# Patient Record
Sex: MALE | Race: WHITE | NOT HISPANIC OR LATINO | Employment: UNEMPLOYED | URBAN - METROPOLITAN AREA
[De-identification: names, ages, dates, MRNs, and addresses within clinical notes are randomized per-mention and may not be internally consistent; named-entity substitution may affect disease eponyms.]

---
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
## 2023-06-19 ENCOUNTER — OFFICE VISIT (OUTPATIENT)
Dept: OPHTHALMOLOGY | Facility: CLINIC | Age: 18
End: 2023-06-19
Attending: OPTOMETRIST
Payer: OTHER GOVERNMENT

## 2023-06-19 DIAGNOSIS — H52.12 MYOPIA, LEFT EYE: ICD-10-CM

## 2023-06-19 DIAGNOSIS — M46.1 SACROILIITIS (H): Primary | ICD-10-CM

## 2023-06-19 PROCEDURE — 92015 DETERMINE REFRACTIVE STATE: CPT | Mod: GY | Performed by: OPTOMETRIST

## 2023-06-19 PROCEDURE — G0463 HOSPITAL OUTPT CLINIC VISIT: HCPCS | Performed by: OPTOMETRIST

## 2023-06-19 PROCEDURE — 92014 COMPRE OPH EXAM EST PT 1/>: CPT | Performed by: OPTOMETRIST

## 2023-06-19 ASSESSMENT — CONF VISUAL FIELD
METHOD: COUNTING FINGERS
OD_NORMAL: 1
OD_SUPERIOR_NASAL_RESTRICTION: 0
OD_INFERIOR_NASAL_RESTRICTION: 0
OS_SUPERIOR_NASAL_RESTRICTION: 0
OS_INFERIOR_TEMPORAL_RESTRICTION: 0
OS_NORMAL: 1
OD_SUPERIOR_TEMPORAL_RESTRICTION: 0
OD_INFERIOR_TEMPORAL_RESTRICTION: 0
OS_INFERIOR_NASAL_RESTRICTION: 0
OS_SUPERIOR_TEMPORAL_RESTRICTION: 0

## 2023-06-19 ASSESSMENT — REFRACTION_MANIFEST
OD_CYLINDER: SPHERE
OS_CYLINDER: SPHERE
OD_SPHERE: PLANO
OS_SPHERE: -0.75

## 2023-06-19 ASSESSMENT — EXTERNAL EXAM - LEFT EYE: OS_EXAM: NORMAL

## 2023-06-19 ASSESSMENT — VISUAL ACUITY
OS_SC: J1+
OS_SC+: +2
OD_SC: J1+
METHOD: SNELLEN - LINEAR
OS_SC: 20/30-1
OD_SC: 20/20

## 2023-06-19 ASSESSMENT — SLIT LAMP EXAM - LIDS
COMMENTS: NORMAL
COMMENTS: NORMAL

## 2023-06-19 ASSESSMENT — TONOMETRY
IOP_METHOD: ICARE
OS_IOP_MMHG: 19
OD_IOP_MMHG: 18

## 2023-06-19 ASSESSMENT — CUP TO DISC RATIO
OS_RATIO: 0.2
OD_RATIO: 0.2

## 2023-06-19 ASSESSMENT — EXTERNAL EXAM - RIGHT EYE: OD_EXAM: NORMAL

## 2023-06-19 NOTE — Clinical Note
Thank you for referring Laurent AUSTIN Eduard for his annual eye exam. No uveitis on examination today. Ocular health was normal on examination. Recommended repeat evaluation in 1 year. Please contact me with any questions. Toñito Jeffers, OD on 6/21/2021 at 2:43 PM

## 2023-06-19 NOTE — PROGRESS NOTES
History  HPI    Laurent is here with his father for a three year (overdue) exam due to Sacroiliitis. No vision concerns at this time. No complaints of eye pain, redness, light sensitivity, or excessive tearing. No strabismus/AHP.     Last edited by Nehemias Jay COT on 6/19/2023 10:53 AM.          Assessment/Plan  (M46.1) Sacroiliitis (H)  (primary encounter diagnosis)  Comment: No uveitis on examination today, ocular health normal on examination today.  Plan:  Educated patient on clinical findings and the importance of continued management with primary care physician. Continue management as directed and return to clinic in 1 year for dilated exam, or sooner, as needed. Copy of chart sent to Dr. Chawla.    (H52.12) Myopia, left eye  Comment: Myopia left eye, first spectacle prescription   Plan: HC REFRACTION         Dispensed spectacle prescription for as-needed wear. Monitor annually.    Return to clinic in 1 year for comprehensive eye exam.    Complete documentation of historical and exam elements from today's encounter can  be found in the full encounter summary report (not reduplicated in this progress  note). I personally obtained the chief complaint(s) and history of present illness. I  confirmed and edited as necessary the review of systems, past medical/surgical  history, family history, social history, and examination findings as documented by  others; and I examined the patient myself. I personally reviewed the relevant tests,  images, and reports as documented above. I formulated and edited as necessary the  assessment and plan and discussed the findings and management plan with the  patient and family.    Toñito Jeffers, OD, FAAO

## 2023-06-19 NOTE — NURSING NOTE
Chief Complaint(s) and History of Present Illness(es)     Sacroiliitis           Comments    Laurent is here with his father for a three year (overdue) exam due to Sacroiliitis. No vision concerns at this time. No complaints of eye pain, redness, light sensitivity, or excessive tearing. No strabismus/AHP.

## 2023-09-08 NOTE — PROGRESS NOTES
Rheumatology History:   Date of first visit to center: 8/13/2020  Date of CESAR diagnosis: 8/13/2020  ILAR category: enthesitis-related  DANNIELLE Status: negative   RF Status: not done   CCP Status: not done   HLA-B27 Status: positive        Ophthalmology History:   Iritis/Uveitis Comorbidity: no   Date of last eye exam: 6/19/2023          Medications:   As of completion of this visit:  Current Outpatient Medications   Medication Sig Dispense Refill     EPINEPHrine (ANY BX GENERIC EQUIV) 0.3 MG/0.3ML injection 2-pack Inject 0.3 mg into the muscle as needed       Melatonin 2.5 MG CHEW 2.5 mg nightly or as needed.       meloxicam (MOBIC) 7.5 MG tablet Take 1 tablet (7.5 mg) by mouth daily 90 tablet 2     omeprazole (PRILOSEC) 20 MG DR capsule Take 1 capsule (20 mg) by mouth daily 30 capsule 3     Date of last TB Screen:  9/18/20           Subjective:   Laurent is a 18 year old male who was seen in Pediatric Rheumatology clinic today for follow up. Laurent was last seen in our clinic on 6/21/21 and returns today accompanied by his dad, Adama  The primary encounter diagnosis was Sacroiliitis (H). Diagnoses of NSAID long-term use, At risk for uveitis, and CESAR (juvenile idiopathic arthritis), enthesitis related arthritis (H) were also pertinent to this visit.      At Ismael's last visit, which was over two years ago, there was concern for GI upset and elevated fecal calprotectin caused by mild Crohn's disease vs meloxicam. The plan at that time was for Ismael to go to college in [place] with his family moving to [place] and to establish care with a rheumatologist in either location. Ismael did go to college for a year, but decided that academics was not a good fit for him. He did not establish care with a rheumatologist or GI doctor.     Ismael has been taking the meloxicam as needed for his SI joint pain L>R. He has been needing it almost every day lately and is almost out. He has noticed that when he's exposed to peanuts  (allergic) his arthritis will flare and he will have additional pain. He will take Benadryl for the allergy component, but doesn't take anything beyond meloxicam for the arthritis pain. He has soreness/stiffness in his lower back, but does not have stiffness or swelling in additional joints. He is not having abdominal pain or GI symptoms. The pain has come and gone over the past two years.     Ismael is working full time at the [employer]. He's considering becoming an . He is planning to move with his family to [place] this fall. They are closing on a house next month. Ismael works out often and does weights, body weight exercises, and just started doing cardio in his home gym.      Ismael also has had a runny nose persistently. His family has a history of allergic rhinitis that improves with OTC Flonase.     Prescribed medications have been administered regularly, without missed doses. Medications have been tolerated well, without side effects.    Comprehensive Review of Systems is otherwise negative.    Information per our standardized questionnaire is as below:    Self Report  Patient Pain Status: 1 (This is measured 0 = no pain, 10 = very severe pain)  Patient Global Assessment of Disease Activity: 0 (This is measured 0 = very well, 10 = very poorly)  Patient Highest Level of Education: high school     Interim Arthritis History  Morning Stiffness in the past week: no stiffness  Recent Back Pain: Yes    Since your last visit has your arthritis stopped you from trying any athletic or rigorous activities or interfaced with your ability to do these activities? Yes  Have you been limited your ability to do normal daily activities in the past week? No  Did you need help from other people to do normal activities in the past week? No  Have you used any aids or devices to help you do normal daily activities in the past week? No           Examination:   Blood pressure 107/72, pulse 66, temperature 98.1  F (36.7  " C), temperature source Oral, height 1.644 m (5' 4.72\"), weight 65 kg (143 lb 4.8 oz), SpO2 98 %.  Facility age limit for growth %lauro is 20 years.  Growth %ile SmartLinks can only be used for patients less than 20 years old.  Body surface area is 1.72 meters squared.     Gen: Well appearing; cooperative. No acute distress.  Head: Normal head and hair.  Eyes: No scleral injection, pupils normal.  Nose: No deformity, no rhinorrhea or congestion. No sores.  Mouth: Normal teeth and gums. Moist mucus membranes.   Lymphatics: No cervical, supraclavicular, axillary, or inguinal lymphadenopathy.  Lungs: No increased work of breathing. Lungs clear to auscultation bilaterally.  Heart: Regular rate and rhythm. No murmurs. Normal S1/S2. Normal peripheral perfusion.  Abdomen: Soft, non-tender, non-distended. No hepatosplenomegaly.  Skin: No rashes or lesions.  Neuro: Alert, interactive. Answers questions appropriately. CN intact. Normal strength and tone.   MSK: Mild tenderness to palpation of left SI joint. Tight hips with internal rotation. No evidence of current synovitis/arthritis of the cervical spine, TMJ, sternoclavicular, acromioclavicular, glenohumeral, elbow, wrists, finger, sacroiliac, hip, knee, ankle, or toe joints. No tendonitis or bursitis. No enthesitis. No leg length discrepancy. Gait is normal with walking and running.    Positive MAITE test:  No    Total active joints:  1   Total limited joints:  0  Tender entheses count:  0  SI Tenderness: Yes         Last Lab Results:     Recent Results (from the past 168 hour(s))   Erythrocyte sedimentation rate auto    Collection Time: 09/11/23  1:00 PM   Result Value Ref Range    Erythrocyte Sedimentation Rate 1 0 - 15 mm/hr   CRP inflammation    Collection Time: 09/11/23  1:00 PM   Result Value Ref Range    CRP Inflammation <3.00 <5.00 mg/L   Creatinine    Collection Time: 09/11/23  1:00 PM   Result Value Ref Range    Creatinine 1.01 0.67 - 1.17 mg/dL    GFR Estimate " >90 >60 mL/min/1.73m2   Routine UA with micro reflex to culture    Collection Time: 09/11/23  1:00 PM    Specimen: Urine, Midstream   Result Value Ref Range    Color Urine Light Yellow Colorless, Straw, Light Yellow, Yellow    Appearance Urine Clear Clear    Glucose Urine Negative Negative mg/dL    Bilirubin Urine Negative Negative    Ketones Urine Negative Negative mg/dL    Specific Gravity Urine 1.025 1.003 - 1.035    Blood Urine Negative Negative    pH Urine 7.0 5.0 - 7.0    Protein Albumin Urine Negative Negative mg/dL    Urobilinogen Urine Normal Normal, 2.0 mg/dL    Nitrite Urine Negative Negative    Leukocyte Esterase Urine Negative Negative    Mucus Urine Present (A) None Seen /LPF    RBC Urine 2 <=2 /HPF    WBC Urine <1 <=5 /HPF   Hepatic panel    Collection Time: 09/11/23  1:00 PM   Result Value Ref Range    Protein Total 7.7 6.4 - 8.3 g/dL    Albumin 4.9 3.5 - 5.2 g/dL    Bilirubin Total 0.6 <=1.2 mg/dL    Alkaline Phosphatase 80 40 - 129 U/L    AST 28 0 - 45 U/L    ALT 26 0 - 70 U/L    Bilirubin Direct <0.20 0.00 - 0.30 mg/dL   CBC with platelets and differential    Collection Time: 09/11/23  1:00 PM   Result Value Ref Range    WBC Count 7.9 4.0 - 11.0 10e3/uL    RBC Count 5.34 4.40 - 5.90 10e6/uL    Hemoglobin 15.3 13.3 - 17.7 g/dL    Hematocrit 45.8 40.0 - 53.0 %    MCV 86 78 - 100 fL    MCH 28.7 26.5 - 33.0 pg    MCHC 33.4 31.5 - 36.5 g/dL    RDW 12.4 10.0 - 15.0 %    Platelet Count 232 150 - 450 10e3/uL    % Neutrophils 59 %    % Lymphocytes 28 %    % Monocytes 10 %    % Eosinophils 2 %    % Basophils 0 %    % Immature Granulocytes 1 %    NRBCs per 100 WBC 0 <1 /100    Absolute Neutrophils 4.7 1.6 - 8.3 10e3/uL    Absolute Lymphocytes 2.2 0.8 - 5.3 10e3/uL    Absolute Monocytes 0.8 0.0 - 1.3 10e3/uL    Absolute Eosinophils 0.1 0.0 - 0.7 10e3/uL    Absolute Basophils 0.0 0.0 - 0.2 10e3/uL    Absolute Immature Granulocytes 0.0 <=0.4 10e3/uL    Absolute NRBCs 0.0 10e3/uL   HLA-B27 Typing    Collection  Time: 09/11/23  1:08 PM   Result Value Ref Range    [id number] METHOD SSOP     B locus B27 Pos           Assessment:   Ismael is a 18 year old male with sacroiliitis who is having symptoms while on NSAID therapy.     Given Ismael's past diagnostic uncertainty regarding whether his growth concerns and GI evaluation were consistent with mild Crohn's vs meloxicam with improving sacroiliitis had been informing our prior medication choice. Ideally, we would not have had a two year gap in visits while on NSAID monotherapy. Ismael is continuing to have waxing and waning pain with almost daily meloxicam use. He does not have additional joint involvement outside of his SI joints on history or physical exam. I would like to get an MRI to assess progression of Ismael's disease while on NSAID monotherapy.     If there is persistent inflammation on MRI, I would like to escalate Ismael to treatment with a TNF inhibitor. A TNF inhibitor such as Humira or Enbrel is the next appropriate treatment for sacroiliitis per the American College of Rheumatology/Arthritis Foundation 2019 guidelines for therapeutic approaches for sacroiliitis.     Citation: Patsy S, [facility] ST, Rosanne T, Raji D, Marie CA, Meghann ML, Edgar RA, Leda BM, Favian PJ, Len H, Donnie J, Evelyn J, Kan PA, Farhan MJ, Tavo VERDE, Lisa ML, Camilla CE, Jermey R, Yemi O, Isaak K, Molly AA, Avelino N, West AM, Hipolito M, Adi A, [place] J. 2019 American College of Rheumatology/Arthritis Foundation Guideline for the Treatment of Juvenile Idiopathic Arthritis: Therapeutic Approaches for Non-Systemic Polyarthritis, Sacroiliitis, and Enthesitis. Arthritis Care Res ([place]). 2019 Raymundo;71(6):717-734. doi: 10.1002/acr.91151. Epub 2019 Apr 25. PMID: 52678835; PMCID: RNX5009025.    Ismael will be moving to [place] with his family in the next few months. He will need to establish care with a rheumatologist and gastroenterologist for follow up in  [place]. When I call to discuss MRI results and plan with Ismael and his family, they will tell me who they have identified as Ismael's rheumatologist so that I can provide a warm handoff.     Ismael's labs today do not indicate medication toxicity from the meloxicam. He is HLA-B27 positive. This is not diagnostic, but is informative and helps classify Ismael's arthritis type as being closest to enthesitis-related arthritis predominantly manifesting with sacroiliitis. There had been prior question regarding an IBD component and whether Ismael's symptoms were associated with IBD. He isn't having GI symptoms at this time and his labs are normal. Ismael should follow up with a gastroenterologist for further assessment of whether he carries a diagnosis of IBD.     Health counseling reviewed: eye screening, exercise, transition    Treat to Target:   [id number] score: 2  Treatment target set: Yes   Treatment target: inactive disease   Disease activity: not at target   Physical function: not at target   Use of algorithm:            Plan:   1. Labs: We will get labs today. Expect results in the mail unless we need to discuss results.   2. Imaging: I have ordered an MRI of Ismael's SI joints. Please call to schedule  3. Medications: I have refilled the meloxicam and we'll discuss if new meds are needed after MRI  4. Referrals: I will connect with the rheumatologist you identify in [place]. Establish care with a GI physician in [place] as well.   5. Eye exams: Continue every yearly-next due June 2024  6. Follow up with new rheumatologist after moving.      If there are any new questions or concerns, I would be glad to help and can be reached through our main office at [phone] or our paging  at [phone].    Review of the result(s) of each unique test - as per lab results  Assessment requiring an independent historian(s) - family - dad  Ordering of each unique test  Prescription drug management  60 minutes spent  by me on the date of the encounter doing chart review, history and exam, documentation and further activities per the note      Kaitlyn Sequeira MD MPH   of Pediatrics  Division of Rheumatology, Allergy, and Immunology      CC  Patient Care Team:  Daniella Chawla MD as PCP - General (Pediatrics)  Alysha Gonzalez MD as MD (Family Medicine - Sports Medicine)  Kaitlyn Sequeira as Fellow (Student in organized health care education/training program)  Adalid Aponte MD as Referring Physician (Pediatric Gastroenterology)  Toñito Jeffers OD as Assigned Surgical Provider  DANIELLA CHAWLA    Copy to patient  Linda Chapa Armen  [id number] [facility] [id number]

## 2023-09-11 ENCOUNTER — OFFICE VISIT (OUTPATIENT)
Dept: RHEUMATOLOGY | Facility: CLINIC | Age: 18
End: 2023-09-11
Attending: STUDENT IN AN ORGANIZED HEALTH CARE EDUCATION/TRAINING PROGRAM
Payer: OTHER GOVERNMENT

## 2023-09-11 VITALS
OXYGEN SATURATION: 98 % | TEMPERATURE: 98.1 F | HEART RATE: 66 BPM | SYSTOLIC BLOOD PRESSURE: 107 MMHG | HEIGHT: 65 IN | DIASTOLIC BLOOD PRESSURE: 72 MMHG | BODY MASS INDEX: 23.88 KG/M2 | WEIGHT: 143.3 LBS

## 2023-09-11 DIAGNOSIS — Z79.1 NSAID LONG-TERM USE: ICD-10-CM

## 2023-09-11 DIAGNOSIS — Z01.00 EYE EXAM, ROUTINE: ICD-10-CM

## 2023-09-11 DIAGNOSIS — M08.80 JIA (JUVENILE IDIOPATHIC ARTHRITIS), ENTHESITIS RELATED ARTHRITIS (H): ICD-10-CM

## 2023-09-11 DIAGNOSIS — M46.1 SACROILIITIS (H): Primary | ICD-10-CM

## 2023-09-11 LAB
ALBUMIN SERPL BCG-MCNC: 4.9 G/DL (ref 3.5–5.2)
ALBUMIN UR-MCNC: NEGATIVE MG/DL
ALP SERPL-CCNC: 80 U/L (ref 40–129)
ALT SERPL W P-5'-P-CCNC: 26 U/L (ref 0–70)
APPEARANCE UR: CLEAR
AST SERPL W P-5'-P-CCNC: 28 U/L (ref 0–45)
BASOPHILS # BLD AUTO: 0 10E3/UL (ref 0–0.2)
BASOPHILS NFR BLD AUTO: 0 %
BILIRUB DIRECT SERPL-MCNC: <0.2 MG/DL (ref 0–0.3)
BILIRUB SERPL-MCNC: 0.6 MG/DL
BILIRUB UR QL STRIP: NEGATIVE
COLOR UR AUTO: ABNORMAL
CREAT SERPL-MCNC: 1.01 MG/DL (ref 0.67–1.17)
CRP SERPL-MCNC: <3 MG/L
EGFRCR SERPLBLD CKD-EPI 2021: >90 ML/MIN/1.73M2
EOSINOPHIL # BLD AUTO: 0.1 10E3/UL (ref 0–0.7)
EOSINOPHIL NFR BLD AUTO: 2 %
ERYTHROCYTE [DISTWIDTH] IN BLOOD BY AUTOMATED COUNT: 12.4 % (ref 10–15)
ERYTHROCYTE [SEDIMENTATION RATE] IN BLOOD BY WESTERGREN METHOD: 1 MM/HR (ref 0–15)
GLUCOSE UR STRIP-MCNC: NEGATIVE MG/DL
HCT VFR BLD AUTO: 45.8 % (ref 40–53)
HGB BLD-MCNC: 15.3 G/DL (ref 13.3–17.7)
HGB UR QL STRIP: NEGATIVE
IMM GRANULOCYTES # BLD: 0 10E3/UL
IMM GRANULOCYTES NFR BLD: 1 %
KETONES UR STRIP-MCNC: NEGATIVE MG/DL
LEUKOCYTE ESTERASE UR QL STRIP: NEGATIVE
LYMPHOCYTES # BLD AUTO: 2.2 10E3/UL (ref 0.8–5.3)
LYMPHOCYTES NFR BLD AUTO: 28 %
MCH RBC QN AUTO: 28.7 PG (ref 26.5–33)
MCHC RBC AUTO-ENTMCNC: 33.4 G/DL (ref 31.5–36.5)
MCV RBC AUTO: 86 FL (ref 78–100)
MONOCYTES # BLD AUTO: 0.8 10E3/UL (ref 0–1.3)
MONOCYTES NFR BLD AUTO: 10 %
MUCOUS THREADS #/AREA URNS LPF: PRESENT /LPF
NEUTROPHILS # BLD AUTO: 4.7 10E3/UL (ref 1.6–8.3)
NEUTROPHILS NFR BLD AUTO: 59 %
NITRATE UR QL: NEGATIVE
NRBC # BLD AUTO: 0 10E3/UL
NRBC BLD AUTO-RTO: 0 /100
PH UR STRIP: 7 [PH] (ref 5–7)
PLATELET # BLD AUTO: 232 10E3/UL (ref 150–450)
PROT SERPL-MCNC: 7.7 G/DL (ref 6.4–8.3)
RBC # BLD AUTO: 5.34 10E6/UL (ref 4.4–5.9)
RBC URINE: 2 /HPF
SP GR UR STRIP: 1.02 (ref 1–1.03)
UROBILINOGEN UR STRIP-MCNC: NORMAL MG/DL
WBC # BLD AUTO: 7.9 10E3/UL (ref 4–11)
WBC URINE: <1 /HPF

## 2023-09-11 PROCEDURE — 86140 C-REACTIVE PROTEIN: CPT | Performed by: STUDENT IN AN ORGANIZED HEALTH CARE EDUCATION/TRAINING PROGRAM

## 2023-09-11 PROCEDURE — 80076 HEPATIC FUNCTION PANEL: CPT | Performed by: STUDENT IN AN ORGANIZED HEALTH CARE EDUCATION/TRAINING PROGRAM

## 2023-09-11 PROCEDURE — 85652 RBC SED RATE AUTOMATED: CPT | Performed by: STUDENT IN AN ORGANIZED HEALTH CARE EDUCATION/TRAINING PROGRAM

## 2023-09-11 PROCEDURE — 81374 HLA I TYPING 1 ANTIGEN LR: CPT | Performed by: STUDENT IN AN ORGANIZED HEALTH CARE EDUCATION/TRAINING PROGRAM

## 2023-09-11 PROCEDURE — 99215 OFFICE O/P EST HI 40 MIN: CPT | Performed by: STUDENT IN AN ORGANIZED HEALTH CARE EDUCATION/TRAINING PROGRAM

## 2023-09-11 PROCEDURE — 36415 COLL VENOUS BLD VENIPUNCTURE: CPT | Performed by: STUDENT IN AN ORGANIZED HEALTH CARE EDUCATION/TRAINING PROGRAM

## 2023-09-11 PROCEDURE — 85018 HEMOGLOBIN: CPT | Performed by: STUDENT IN AN ORGANIZED HEALTH CARE EDUCATION/TRAINING PROGRAM

## 2023-09-11 PROCEDURE — 82565 ASSAY OF CREATININE: CPT | Performed by: STUDENT IN AN ORGANIZED HEALTH CARE EDUCATION/TRAINING PROGRAM

## 2023-09-11 PROCEDURE — 99417 PROLNG OP E/M EACH 15 MIN: CPT | Performed by: STUDENT IN AN ORGANIZED HEALTH CARE EDUCATION/TRAINING PROGRAM

## 2023-09-11 PROCEDURE — 81001 URINALYSIS AUTO W/SCOPE: CPT | Performed by: STUDENT IN AN ORGANIZED HEALTH CARE EDUCATION/TRAINING PROGRAM

## 2023-09-11 PROCEDURE — G0463 HOSPITAL OUTPT CLINIC VISIT: HCPCS | Performed by: STUDENT IN AN ORGANIZED HEALTH CARE EDUCATION/TRAINING PROGRAM

## 2023-09-11 RX ORDER — MELOXICAM 7.5 MG/1
7.5 TABLET ORAL DAILY
Qty: 90 TABLET | Refills: 2 | Status: SHIPPED | OUTPATIENT
Start: 2023-09-11

## 2023-09-11 ASSESSMENT — PAIN SCALES - GENERAL: PAINLEVEL: NO PAIN (0)

## 2023-09-11 NOTE — LETTER
9/11/2023      RE: Laurent Chapa  [address]  [facility] [address]     Dear Colleague,    Thank you for the opportunity to participate in the care of your patient, Laurent Chapa, at the [facility] at [facility]. Please see a copy of my visit note below.        Rheumatology History:   Date of first visit to center: 8/13/2020  Date of CESAR diagnosis: 8/13/2020  ILAR category: enthesitis-related  DANNIELLE Status: negative   RF Status: not done   CCP Status: not done   HLA-B27 Status: positive        Ophthalmology History:   Iritis/Uveitis Comorbidity: no   Date of last eye exam: 6/19/2023          Medications:   As of completion of this visit:  Current Outpatient Medications   Medication Sig Dispense Refill    EPINEPHrine (ANY BX GENERIC EQUIV) 0.3 MG/0.3ML injection 2-pack Inject 0.3 mg into the muscle as needed      Melatonin 2.5 MG CHEW 2.5 mg nightly or as needed.      meloxicam (MOBIC) 7.5 MG tablet Take 1 tablet (7.5 mg) by mouth daily 90 tablet 2    omeprazole (PRILOSEC) 20 MG DR capsule Take 1 capsule (20 mg) by mouth daily 30 capsule 3     Date of last TB Screen:  9/18/20           Subjective:   Laurent is a 18 year old male who was seen in Pediatric Rheumatology clinic today for follow up. Laurent was last seen in our clinic on 6/21/21 and returns today accompanied by his dad, Adama  The primary encounter diagnosis was Sacroiliitis (H). Diagnoses of NSAID long-term use, At risk for uveitis, and CESAR (juvenile idiopathic arthritis), enthesitis related arthritis (H) were also pertinent to this visit.      At Ismael's last visit, which was over two years ago, there was concern for GI upset and elevated fecal calprotectin caused by mild Crohn's disease vs meloxicam. The plan at that time was for Ismael to go to college in [place] with his family moving to [place] and to establish care with a rheumatologist in either  location. Ismael did go to college for a year, but decided that [employer] was not a good fit for him. He did not establish care with a rheumatologist or GI doctor.     Ismael has been taking the meloxicam as needed for his SI joint pain L>R. He has been needing it almost every day lately and is almost out. He has noticed that when he's exposed to peanuts (allergic) his arthritis will flare and he will have additional pain. He will take Benadryl for the allergy component, but doesn't take anything beyond meloxicam for the arthritis pain. He has soreness/stiffness in his lower back, but does not have stiffness or swelling in additional joints. He is not having abdominal pain or GI symptoms. The pain has come and gone over the past two years.     Ismael is working full time at the [employer]. He's considering becoming an . He is planning to move with his family to [place] this fall. They are closing on a house next month. Ismael works out often and does weights, body weight exercises, and just started doing cardio in his home gym.      Ismael also has had a runny nose persistently. His family has a history of allergic rhinitis that improves with OTC Flonase.     Prescribed medications have been administered regularly, without missed doses. Medications have been tolerated well, without side effects.    Comprehensive Review of Systems is otherwise negative.    Information per our standardized questionnaire is as below:    Self Report  Patient Pain Status: 1 (This is measured 0 = no pain, 10 = very severe pain)  Patient Global Assessment of Disease Activity: 0 (This is measured 0 = very well, 10 = very poorly)  Patient Highest Level of Education: high school     Interim Arthritis History  Morning Stiffness in the past week: no stiffness  Recent Back Pain: Yes    Since your last visit has your arthritis stopped you from trying any athletic or rigorous activities or interfaced with your ability to do these activities?  "Yes  Have you been limited your ability to do normal daily activities in the past week? No  Did you need help from other people to do normal activities in the past week? No  Have you used any aids or devices to help you do normal daily activities in the past week? No           Examination:   Blood pressure 107/72, pulse 66, temperature 98.1  F (36.7  C), temperature source Oral, height 1.644 m (5' 4.72\"), weight 65 kg (143 lb 4.8 oz), SpO2 98 %.  Facility age limit for growth %lauro is 20 years.  Growth %ile SmartLinks can only be used for patients less than 20 years old.  Body surface area is 1.72 meters squared.     Gen: Well appearing; cooperative. No acute distress.  Head: Normal head and hair.  Eyes: No scleral injection, pupils normal.  Nose: No deformity, no rhinorrhea or congestion. No sores.  Mouth: Normal teeth and gums. Moist mucus membranes.   Lymphatics: No cervical, supraclavicular, axillary, or inguinal lymphadenopathy.  Lungs: No increased work of breathing. Lungs clear to auscultation bilaterally.  Heart: Regular rate and rhythm. No murmurs. Normal S1/S2. Normal peripheral perfusion.  Abdomen: Soft, non-tender, non-distended. No hepatosplenomegaly.  Skin: No rashes or lesions.  Neuro: Alert, interactive. Answers questions appropriately. CN intact. Normal strength and tone.   MSK: Mild tenderness to palpation of left SI joint. Tight hips with internal rotation. No evidence of current synovitis/arthritis of the cervical spine, TMJ, sternoclavicular, acromioclavicular, glenohumeral, elbow, wrists, finger, sacroiliac, hip, knee, ankle, or toe joints. No tendonitis or bursitis. No enthesitis. No leg length discrepancy. Gait is normal with walking and running.    Positive MAITE test:  No    Total active joints:  1   Total limited joints:  0  Tender entheses count:  0  SI Tenderness: Yes         Last Lab Results:     Recent Results (from the past 168 hour(s))   Erythrocyte sedimentation rate auto    " Collection Time: 09/11/23  1:00 PM   Result Value Ref Range    Erythrocyte Sedimentation Rate 1 0 - 15 mm/hr   CRP inflammation    Collection Time: 09/11/23  1:00 PM   Result Value Ref Range    CRP Inflammation <3.00 <5.00 mg/L   Creatinine    Collection Time: 09/11/23  1:00 PM   Result Value Ref Range    Creatinine 1.01 0.67 - 1.17 mg/dL    GFR Estimate >90 >60 mL/min/1.73m2   Routine UA with micro reflex to culture    Collection Time: 09/11/23  1:00 PM    Specimen: Urine, Midstream   Result Value Ref Range    Color Urine Light Yellow Colorless, Straw, Light Yellow, Yellow    Appearance Urine Clear Clear    Glucose Urine Negative Negative mg/dL    Bilirubin Urine Negative Negative    Ketones Urine Negative Negative mg/dL    Specific Gravity Urine 1.025 1.003 - 1.035    Blood Urine Negative Negative    pH Urine 7.0 5.0 - 7.0    Protein Albumin Urine Negative Negative mg/dL    Urobilinogen Urine Normal Normal, 2.0 mg/dL    Nitrite Urine Negative Negative    Leukocyte Esterase Urine Negative Negative    Mucus Urine Present (A) None Seen /LPF    RBC Urine 2 <=2 /HPF    WBC Urine <1 <=5 /HPF   Hepatic panel    Collection Time: 09/11/23  1:00 PM   Result Value Ref Range    Protein Total 7.7 6.4 - 8.3 g/dL    Albumin 4.9 3.5 - 5.2 g/dL    Bilirubin Total 0.6 <=1.2 mg/dL    Alkaline Phosphatase 80 40 - 129 U/L    AST 28 0 - 45 U/L    ALT 26 0 - 70 U/L    Bilirubin Direct <0.20 0.00 - 0.30 mg/dL   CBC with platelets and differential    Collection Time: 09/11/23  1:00 PM   Result Value Ref Range    WBC Count 7.9 4.0 - 11.0 10e3/uL    RBC Count 5.34 4.40 - 5.90 10e6/uL    Hemoglobin 15.3 13.3 - 17.7 g/dL    Hematocrit 45.8 40.0 - 53.0 %    MCV 86 78 - 100 fL    MCH 28.7 26.5 - 33.0 pg    MCHC 33.4 31.5 - 36.5 g/dL    RDW 12.4 10.0 - 15.0 %    Platelet Count 232 150 - 450 10e3/uL    % Neutrophils 59 %    % Lymphocytes 28 %    % Monocytes 10 %    % Eosinophils 2 %    % Basophils 0 %    % Immature Granulocytes 1 %    NRBCs per  100 WBC 0 <1 /100    Absolute Neutrophils 4.7 1.6 - 8.3 10e3/uL    Absolute Lymphocytes 2.2 0.8 - 5.3 10e3/uL    Absolute Monocytes 0.8 0.0 - 1.3 10e3/uL    Absolute Eosinophils 0.1 0.0 - 0.7 10e3/uL    Absolute Basophils 0.0 0.0 - 0.2 10e3/uL    Absolute Immature Granulocytes 0.0 <=0.4 10e3/uL    Absolute NRBCs 0.0 10e3/uL   HLA-B27 Typing    Collection Time: 09/11/23  1:08 PM   Result Value Ref Range    [id number] METHOD SSOP     B locus B27 Pos           Assessment:   Ismael is a 18 year old male with sacroiliitis who is having symptoms while on NSAID therapy.     Given Ismael's past diagnostic uncertainty regarding whether his growth concerns and GI evaluation were consistent with mild Crohn's vs meloxicam with improving sacroiliitis had been informing our prior medication choice. Ideally, we would not have had a two year gap in visits while on NSAID monotherapy. Ismael is continuing to have waxing and waning pain with almost daily meloxicam use. He does not have additional joint involvement outside of his SI joints on history or physical exam. I would like to get an MRI to assess progression of Ismael's disease while on NSAID monotherapy.     If there is persistent inflammation on MRI, I would like to escalate Ismael to treatment with a TNF inhibitor. A TNF inhibitor such as Humira or Enbrel is the next appropriate treatment for sacroiliitis per the American College of Rheumatology/Arthritis Foundation 2019 guidelines for therapeutic approaches for sacroiliitis.     Citation: Patsy S, [facility] ST, Rosanne T, Raji D, Marie CA, Meghann ML, Edgar RA, Leda BM, Favian PJ, Len H, Donnie J, Evelyn J, Kan PA, Farhan MJ, Tavo MH, Lisa ML, Camilla CE, Jeremy R, Yemi O, Isaak K, Molly AA, Avelino N, West AM, Hipolito M, Adi A, Stockton J. 2019 American College of Rheumatology/Arthritis Foundation Guideline for the Treatment of Juvenile Idiopathic Arthritis: Therapeutic Approaches for  Non-Systemic Polyarthritis, Sacroiliitis, and Enthesitis. Arthritis Care Res ([place]). 2019 Raymundo;71(6):717-734. doi: 10.1002/acr.98513. Epub 2019 Apr 25. PMID: 47457124; PMCID: DVD1938963.    Ismael will be moving to [place] with his family in the next few months. He will need to establish care with a rheumatologist and gastroenterologist for follow up in [place]. When I call to discuss MRI results and plan with Ismael and his family, they will tell me who they have identified as Ismael's rheumatologist so that I can provide a warm handoff.     Ismael's labs today do not indicate medication toxicity from the meloxicam. He is HLA-B27 positive. This is not diagnostic, but is informative and helps classify Ismael's arthritis type as being closest to enthesitis-related arthritis predominantly manifesting with sacroiliitis. There had been prior question regarding an IBD component and whether Ismael's symptoms were associated with IBD. He isn't having GI symptoms at this time and his labs are normal. Ismael should follow up with a gastroenterologist for further assessment of whether he carries a diagnosis of IBD.     Health counseling reviewed: eye screening, exercise, transition    Treat to Target:   [id number] score: 2  Treatment target set: Yes   Treatment target: inactive disease   Disease activity: not at target   Physical function: not at target   Use of algorithm:            Plan:   Labs: We will get labs today. Expect results in the mail unless we need to discuss results.   Imaging: I have ordered an MRI of Ismael's SI joints. Please call to schedule  Medications: I have refilled the meloxicam and we'll discuss if new meds are needed after MRI  Referrals: I will connect with the rheumatologist you identify in [place]. Establish care with a GI physician in [place] as well.   Eye exams: Continue every yearly-next due June 2024  Follow up with new rheumatologist after moving.      If there are any new questions or  concerns, I would be glad to help and can be reached through our main office at [phone] or our paging  at [phone].    Review of the result(s) of each unique test - as per lab results  Assessment requiring an independent historian(s) - family - dad  Ordering of each unique test  Prescription drug management  60 minutes spent by me on the date of the encounter doing chart review, history and exam, documentation and further activities per the note      Kaitlyn Sequeira MD MPH   of Pediatrics  Division of Rheumatology, Allergy, and Immunology      CC  Patient Care Team:  Daniella Chawla MD as PCP - General (Pediatrics)    Copy to patient  Linda Chapa [place]  [address] [facility] [address]

## 2023-09-11 NOTE — PATIENT INSTRUCTIONS
Laurent Chapa saw Dr. Sequeira on September 11, 2023 for a follow-up/initial visit regarding his sacroiliitis    Overall Assessment: Ismael is still having sacroiliitis. I would like to get an MRI to reassess how active it is.     Plan:    Labs: We will get labs today. Expect results in the mail.     Imaging: I have ordered an MRI. Please call to schedule    Medications: I have refilled the meloxicam and we'll discuss if new meds are needed after MRI    Referrals: I will connect with the rheumatologist you identify in [place]    Eye exams: Continue every yearly-next due June 2024    Follow up with- new rheumatologist after moving.     Thank you for allowing me to participate in Laurent's care. If there are any questions or concerns, please do not hesitate to contact us at the phone numbers below.    Kaitlyn Sequeira MD, MPH   of Pediatrics  Division of Rheumatology, Allergy, and Immunology     For Patient Education Materials:  [email or web address]/fo       [facility] Physicians Pediatric Rheumatology    For Help:  The Pediatric Call Center at [phone] can help with scheduling of routine follow up visits.  Ciara Carter and Ellyn Horne are the Nurse Coordinators for the Division of Pediatric Rheumatology and can be reached by phone at [phone] or through [employer] ([email or web address]). They can help with questions about your child s rheumatic condition, medications, and test results.  For emergencies after hours or on the weekends, please call the page  at [phone] and ask to speak to the physician on-call for Pediatric Rheumatology. Please do not use [employer] for urgent requests.  Main  Services:  [phone]  Hmong/Nauruan/Solomon Islander: [phone]  Norwegian: [phone]  Cymraes: [phone]    Internal Referrals: If we refer your child to another physician/team within [facility], you should receive a call to set this up. If you do not hear anything within  a week, please call the Call Center at [phone].    External Referrals: If we refer your child to a physician/team outside of [facility], our team will send the referral order and relevant records to them. We ask that you call the place where your child is being referred to ensure they received the needed information and notify our team coordinators if not.    Imaging: If your child needs an imaging study that is not being performed the day of your clinic appointment, please call to set this up. For xrays, ultrasounds, and echocardiogram call [phone]. For CT or MRI call [phone].     MyChart: We encourage you to sign up for MyChart at [email or web address]. For assistance or questions, call [phone]. If your child is 12 years or older, a consent for proxy/parent access needs to be signed so please discuss this with your physician at the next visit.

## 2023-09-11 NOTE — NURSING NOTE
"Chief Complaint   Patient presents with    Follow Up     3 month follow up        Vitals:    09/11/23 1221   BP: 107/72   BP Location: Right arm   Patient Position: Sitting   Cuff Size: Adult Large   Pulse: 66   Temp: 98.1  F (36.7  C)   TempSrc: Oral   SpO2: 98%   Weight: 143 lb 4.8 oz (65 kg)   Height: 5' 4.72\" (164.4 cm)     Patient MyChart Active? No  If no, would they like to sign up? Yes    Does patient need PHQ-2 completed today? No    Liseth Lerner  September 11, 2023  "

## 2023-09-13 LAB
B LOCUS: NORMAL
B27TEST METHOD: NORMAL

## 2023-09-22 ENCOUNTER — HOSPITAL ENCOUNTER (OUTPATIENT)
Dept: MRI IMAGING | Facility: CLINIC | Age: 18
Discharge: HOME | End: 2023-09-22
Attending: STUDENT IN AN ORGANIZED HEALTH CARE EDUCATION/TRAINING PROGRAM | Admitting: STUDENT IN AN ORGANIZED HEALTH CARE EDUCATION/TRAINING PROGRAM
Payer: OTHER GOVERNMENT

## 2023-09-22 DIAGNOSIS — M46.1 SACROILIITIS (H): ICD-10-CM

## 2023-09-22 PROCEDURE — A9585 GADOBUTROL INJECTION: HCPCS | Mod: JZ | Performed by: STUDENT IN AN ORGANIZED HEALTH CARE EDUCATION/TRAINING PROGRAM

## 2023-09-22 PROCEDURE — 72197 MRI PELVIS W/O & W/DYE: CPT | Mod: 26 | Performed by: RADIOLOGY

## 2023-09-22 PROCEDURE — 255N000002 HC RX 255 OP 636: Mod: JZ | Performed by: STUDENT IN AN ORGANIZED HEALTH CARE EDUCATION/TRAINING PROGRAM

## 2023-09-22 PROCEDURE — 72197 MRI PELVIS W/O & W/DYE: CPT

## 2023-09-22 RX ORDER — GADOBUTROL 604.72 MG/ML
0.1 INJECTION INTRAVENOUS ONCE
Status: COMPLETED | OUTPATIENT
Start: 2023-09-22 | End: 2023-09-22

## 2023-09-22 RX ADMIN — GADOBUTROL 6.5 ML: 604.72 INJECTION INTRAVENOUS at 16:35

## 2023-09-26 ENCOUNTER — TELEPHONE (OUTPATIENT)
Dept: RHEUMATOLOGY | Facility: CLINIC | Age: 18
End: 2023-09-26
Payer: OTHER GOVERNMENT

## 2023-09-26 DIAGNOSIS — M08.80 JIA (JUVENILE IDIOPATHIC ARTHRITIS), ENTHESITIS RELATED ARTHRITIS (H): Primary | ICD-10-CM

## 2023-09-26 DIAGNOSIS — M46.1 SACROILIITIS (H): ICD-10-CM

## 2023-09-26 NOTE — TELEPHONE ENCOUNTER
Pediatric Rheumatology Phone Consult    Name of Caller (Relationship to Patient): Adama Chapa  Date: 9/26/23  Time: 9:30AM    Question/Discussion:   Dr. Sequeira called Adama to discuss the results of Ismael's recent pelvis MRI which shows ongoing inflammation of the SI joints. Due to this inflammation, Dr. Sequeira will be prescribing Humira, which has been discussed as an option in the past. Dr. Sequeira placed the prior authorization today. When approved, I will have our nurses will call to discuss administration of Humira with Ismael.     The Eduard family is planning to move to [place] some time this upcoming November. Timing isn't clear as their house is still being built. Adama notes that the home phone number will be disconnected at the end of October, but that the cell numbers will still work.     Adama will call to set up an appointment for Ismael to come see Dr. Sequeira prior to the move to help ensure a smooth transition.     Adama expressed understanding of my recommendations, agreed with the plan above, and had no further questions.    Kaitlyn Sequeira MD MPH   of Pediatrics  Division of Rheumatology, Allergy, and Immunology

## 2023-09-26 NOTE — TELEPHONE ENCOUNTER
Prior Authorization Approval    Medication: HUMIRA *CF* PEN 40 MG/0.4ML SC PNKT  Authorization Effective Date: 8/27/2023  Authorization Expiration Date: 12/31/2099  Approved Dose/Quantity:   Reference #: Key: [id number] - PA Case ID: [id number]   Insurance Company: [employer] - Phone [phone] Fax [phone]  Expected CoPay: $    CoPay Card Available:      Financial Assistance Needed:   [facility] [address]  Pharmacy Notified: Documented on Rx-Will also upload fax when it arrives  Patient Notified: left general msg to return my call, no consent on file

## 2023-09-27 ENCOUNTER — TELEPHONE (OUTPATIENT)
Dept: RHEUMATOLOGY | Facility: CLINIC | Age: 18
End: 2023-09-27
Payer: OTHER GOVERNMENT

## 2023-09-27 DIAGNOSIS — M08.80 JIA (JUVENILE IDIOPATHIC ARTHRITIS), ENTHESITIS RELATED ARTHRITIS (H): ICD-10-CM

## 2023-09-27 DIAGNOSIS — M46.1 SACROILIITIS (H): ICD-10-CM

## 2023-09-27 NOTE — TELEPHONE ENCOUNTER
-- DO NOT REPLY / DO NOT REPLY ALL --  -- Message is from the [facility]--    Patient is requesting a medication refill - medication is on active medication list    Patient is currently OUT of the requested medication.    Was Medication Pended?  No.     Rx Name and Dose:  sertraline (ZOLOFT) 25 MG tablet    Duration: 90 days      Pharmacy  [id number] - [place], [place] - [address]    Patient confirmed the above pharmacy as correct?  Yes    Caller Information       Type Contact Phone    02/17/2020 10:03 AM Phone (Incoming) Paulo TRONCOSO (Self) [phone] (H)          Alternative phone number: \n/a    Turnaround time given to caller:   \"This message will be sent to [state Provider's name]. The clinical team will fulfill your request as soon as they review your message.\"   1505: Pooja Flood returned the breast care coordinator's call. Discussed sentinel lymph node mapping procedure scheduled Tuesday, February 22 at [address] [place] [address]. Procedure explained including arrival time and location. Ms. Gurmeet Roche instructed to arrive at the hospital at [id number],   park in the [facility], enter [place] entrance. Ms. Gurmeet Roche verbalized understanding and gratitude for the call. No questions at this time. Called dad's phone and spoke to Laurent.    Pediatric Rheumatology Nurse Teaching:    Learners:Laurent    Barriers to learning:none    Medications:Humira 40 mg pen subcutaneous every 14 days    Reviewed dose, frequency, side effects and storage.    Injection: Reviewed how to draw up medication/use injection device, appropriate injection sites, how to give a subcutaneous injection, and how to dispose of syringe/needle/device. r.    Reviewed when family should call with concerns/questions. Answered family's questions. Verified family has office phone #.    Sent link for Laurent to sign up for Hello Curry and also asked him to watch video on either [employer] or [employer]'s website on injection.     He had no other questions at this time. If he has not heard from the pharmacy in ~ 1 week, I asked for him to call/[employer] to notify us.

## 2023-10-30 ENCOUNTER — OFFICE VISIT (OUTPATIENT)
Dept: RHEUMATOLOGY | Facility: CLINIC | Age: 18
End: 2023-10-30
Attending: STUDENT IN AN ORGANIZED HEALTH CARE EDUCATION/TRAINING PROGRAM
Payer: OTHER GOVERNMENT

## 2023-10-30 VITALS
TEMPERATURE: 98.7 F | HEIGHT: 65 IN | SYSTOLIC BLOOD PRESSURE: 114 MMHG | OXYGEN SATURATION: 100 % | DIASTOLIC BLOOD PRESSURE: 66 MMHG | HEART RATE: 65 BPM | WEIGHT: 144.84 LBS | BODY MASS INDEX: 24.13 KG/M2

## 2023-10-30 DIAGNOSIS — M08.80 JIA (JUVENILE IDIOPATHIC ARTHRITIS), ENTHESITIS RELATED ARTHRITIS (H): ICD-10-CM

## 2023-10-30 DIAGNOSIS — M46.1 SACROILIITIS (H): Primary | ICD-10-CM

## 2023-10-30 DIAGNOSIS — Z79.620 ADALIMUMAB (HUMIRA) LONG-TERM USE: ICD-10-CM

## 2023-10-30 DIAGNOSIS — Z01.00 EYE EXAM, ROUTINE: ICD-10-CM

## 2023-10-30 DIAGNOSIS — Z79.1 NSAID LONG-TERM USE: ICD-10-CM

## 2023-10-30 PROCEDURE — G0463 HOSPITAL OUTPT CLINIC VISIT: HCPCS | Performed by: STUDENT IN AN ORGANIZED HEALTH CARE EDUCATION/TRAINING PROGRAM

## 2023-10-30 PROCEDURE — 99215 OFFICE O/P EST HI 40 MIN: CPT | Performed by: STUDENT IN AN ORGANIZED HEALTH CARE EDUCATION/TRAINING PROGRAM

## 2023-10-30 PROCEDURE — 99213 OFFICE O/P EST LOW 20 MIN: CPT | Performed by: STUDENT IN AN ORGANIZED HEALTH CARE EDUCATION/TRAINING PROGRAM

## 2023-10-30 PROCEDURE — 99417 PROLNG OP E/M EACH 15 MIN: CPT | Performed by: STUDENT IN AN ORGANIZED HEALTH CARE EDUCATION/TRAINING PROGRAM

## 2023-10-30 ASSESSMENT — PAIN SCALES - GENERAL: PAINLEVEL: NO PAIN (0)

## 2023-10-30 NOTE — PATIENT INSTRUCTIONS
Laurent Chapa saw Dr. Sequeira on October 30, 2023 for a follow-up visit regarding his sacroiliitis    Overall Assessment: Ismael is seeing some improvement after a few doses of Humira. We expect him to continue to see improvement with more doses.     Plan:    Labs: No labs today    Imaging: None    Medications: Continue Humira every other week    Eye exams: Yearly-due June 2024    Let us know who you decide to see in adult rheumatology. I will fax records.     Thank you for allowing me to participate in Laurent's care.  If there are any questions or concerns, please do not hesitate to contact us at the phone numbers below.    Kaitlyn Sequeira MD, MPH   of Pediatrics  Division of Rheumatology, Allergy, and Immunology     For Patient Education Materials:  [email or web address]/fo       [facility] Physicians Pediatric Rheumatology    For Help:  The Pediatric Call Center at [phone] can help with scheduling of routine follow up visits.  Ciara Carter and Ellyn Horne are the Nurse Coordinators for the Division of Pediatric Rheumatology and can be reached by phone at [phone] or through [employer] ([email or web address]). They can help with questions about your child s rheumatic condition, medications, and test results.  For emergencies after hours or on the weekends, please call the page  at [phone] and ask to speak to the physician on-call for Pediatric Rheumatology. Please do not use [employer] for urgent requests.  Main  Services:  [phone]  Hmong/Persian/Azeem: [phone]  Thai: [phone]  Macedonian: [phone]    Internal Referrals: If we refer your child to another physician/team within [facility], you should receive a call to set this up. If you do not hear anything within a week, please call the Call Center at [phone].    External Referrals: If we refer your child to a physician/team outside of [facility], our team will send the referral  order and relevant records to them. We ask that you call the place where your child is being referred to ensure they received the needed information and notify our team coordinators if not.    Imaging: If your child needs an imaging study that is not being performed the day of your clinic appointment, please call to set this up. For xrays, ultrasounds, and echocardiogram call [phone]. For CT or MRI call [phone].     MyChart: We encourage you to sign up for [employer] at [email or web address]. For assistance or questions, call [phone]. If your child is 12 years or older, a consent for proxy/parent access needs to be signed so please discuss this with your physician at the next visit.

## 2023-10-30 NOTE — PROGRESS NOTES
Rheumatology History:   Date of symptom onset:    Date of first visit to center: 8/13/2020  Date of CESAR diagnosis: 8/13/2020  ILAR category: enthesitis-related  DANNIELLE Status: negative   RF Status: not done   CCP Status: not done   HLA-B27 Status: positive        Ophthalmology History:   Iritis/Uveitis Comorbidity: no   Date of last eye exam: 6/19/2023          Medications:   As of completion of this visit:  Current Outpatient Medications   Medication Sig Dispense Refill     adalimumab (HUMIRA *CF*) 40 MG/0.4ML pen kit Inject 0.4 mLs (40 mg) Subcutaneous every 14 days 2 each 11     EPINEPHrine (ANY BX GENERIC EQUIV) 0.3 MG/0.3ML injection 2-pack Inject 0.3 mg into the muscle as needed       Melatonin 2.5 MG CHEW 2.5 mg nightly or as needed.       meloxicam (MOBIC) 7.5 MG tablet Take 1 tablet (7.5 mg) by mouth daily 90 tablet 2     omeprazole (PRILOSEC) 20 MG DR capsule Take 1 capsule (20 mg) by mouth daily 30 capsule 3     Date of last TB Screen:  9/18/20         Allergies:     Allergies   Allergen Reactions     Peanuts [Nuts] Nausea and Vomiting            Subjective:   Laurent is a 18 year old male who was seen in Pediatric Rheumatology clinic today for follow up.  Laurent was last seen in our clinic on 9/11/2023 and returns today accompanied by his dad, Adama. The primary encounter diagnosis was Sacroiliitis (H24). Diagnoses of CESAR (juvenile idiopathic arthritis), enthesitis related arthritis (H), NSAID long-term use, Adalimumab (Humira) long-term use, and At risk for uveitis were also pertinent to this visit.      Ismael has been doing well since his last visit to rheumatology.   Ismael got an MRI of his pelvis on 9/22/23:  MRI findings consistent with sacroiliitis, greater at the left sacroiliac joint, as above. This is located along the anterior inferior aspect of the left sacroiliac joint with bone marrow edema on both sides of the joint as well as enhancement. There is subtle edema and enhancement at the right  "anterior inferior sacroiliac joint,  however this has significantly decreased since the comparison MRI dated 8/4/2020.    After this MRI, we submitted a prior authorization for Humira. He has gotten two doses of the Humira and is doing well so far. He hasn't noticed a big difference. He had a little bit of buttock pain last night when he was getting ready for bed, but other than that he's been fine. He has stopped taking the meloxicam. Ismael has taken off from work to get ready for the move. He doesn't have a job lined up in [place] yet. He also hasn't been working out as much lately.     They are getting ready to move to [place]. Their house is still being built, but they'll stay in [place] until it's ready.    They have not picked out an adult provider yet. They are going to do it once they have moved. He will continue on his dad's  insurance.       Comprehensive Review of Systems is otherwise negative.    Information per our standardized questionnaire is as below:    Self Report  Patient Pain Status: 2 (This is measured 0 = no pain, 10 = very severe pain)  Patient Global Assessment of Disease Activity: 0.5 (This is measured 0 = very well, 10 = very poorly)  Patient Highest Level of Education: high school     Interim Arthritis History  Morning Stiffness in the past week: no stiffness  Recent Back Pain: No    Since your last visit has your arthritis stopped you from trying any athletic or rigorous activities or interfaced with your ability to do these activities? No  Have you been limited your ability to do normal daily activities in the past week? No  Did you need help from other people to do normal activities in the past week? No  Have you used any aids or devices to help you do normal daily activities in the past week? No    Important Medical Events  None         Examination:   Blood pressure 114/66, pulse 65, temperature 98.7  F (37.1  C), temperature source Oral, height 1.639 m (5' 4.53\"), " weight 65.7 kg (144 lb 13.5 oz), SpO2 100%.  Facility age limit for growth %lauro is 20 years.  Growth %ile SmartLinks can only be used for patients less than 20 years old.  Body surface area is 1.73 meters squared.     Gen: Well appearing; cooperative. No acute distress.  Head: Normal head and hair.  Eyes: No scleral injection, pupils normal.  Nose: No deformity, no rhinorrhea or congestion. No sores.  Mouth: Normal teeth and gums. Moist mucus membranes.   Lymphatics: No cervical or supraclavicular lymphadenopathy.  Lungs: No increased work of breathing. Lungs clear to auscultation bilaterally.  Heart: Regular rate and rhythm. No murmurs. Normal S1/S2. Normal peripheral perfusion.  Abdomen: Soft, non-tender, non-distended. No hepatosplenomegaly.  Skin: No rashes or lesions.  Neuro: Alert, interactive. Answers questions appropriately. CN intact. Normal strength and tone.   MSK: Improved hip ROM. Still limited with internal rotation. No evidence of current synovitis/arthritis of the cervical spine, TMJ, sternoclavicular, acromioclavicular, glenohumeral, elbow, wrists, finger, hip, knee, ankle, or toe joints. No tendonitis or bursitis. No enthesitis.  No leg length discrepancy. Gait is normal with walking and running.     Positive MAITE test:  No    Total active joints:  2   Total limited joints:  2  Tender entheses count:  0  SI Tenderness: No         Last Lab Results:     No visits with results within 1 Day(s) from this visit.   Latest known visit with results is:   Office Visit on 09/11/2023   Component Date Value     Erythrocyte Sedimentatio* 09/11/2023 1      CRP Inflammation 09/11/2023 <3.00      Creatinine 09/11/2023 1.01      GFR Estimate 09/11/2023 >90      Color Urine 09/11/2023 Light Yellow      Appearance Urine 09/11/2023 Clear      Glucose Urine 09/11/2023 Negative      Bilirubin Urine 09/11/2023 Negative      Ketones Urine 09/11/2023 Negative      Specific Gravity Urine 09/11/2023 1.025      Blood Urine  09/11/2023 Negative      pH Urine 09/11/2023 7.0      Protein Albumin Urine 09/11/2023 Negative      Urobilinogen Urine 09/11/2023 Normal      Nitrite Urine 09/11/2023 Negative      Leukocyte Esterase Urine 09/11/2023 Negative      Mucus Urine 09/11/2023 Present (A)      RBC Urine 09/11/2023 2      WBC Urine 09/11/2023 <1      Protein Total 09/11/2023 7.7      Albumin 09/11/2023 4.9      Bilirubin Total 09/11/2023 0.6      Alkaline Phosphatase 09/11/2023 80      AST 09/11/2023 28      ALT 09/11/2023 26      Bilirubin Direct 09/11/2023 <0.20      WBC Count 09/11/2023 7.9      RBC Count 09/11/2023 5.34      Hemoglobin 09/11/2023 15.3      Hematocrit 09/11/2023 45.8      MCV 09/11/2023 86      MCH 09/11/2023 28.7      MCHC 09/11/2023 33.4      RDW 09/11/2023 12.4      Platelet Count 09/11/2023 232      % Neutrophils 09/11/2023 59      % Lymphocytes 09/11/2023 28      % Monocytes 09/11/2023 10      % Eosinophils 09/11/2023 2      % Basophils 09/11/2023 0      % Immature Granulocytes 09/11/2023 1      NRBCs per 100 WBC 09/11/2023 0      Absolute Neutrophils 09/11/2023 4.7      Absolute Lymphocytes 09/11/2023 2.2      Absolute Monocytes 09/11/2023 0.8      Absolute Eosinophils 09/11/2023 0.1      Absolute Basophils 09/11/2023 0.0      Absolute Immature Granul* 09/11/2023 0.0      Absolute NRBCs 09/11/2023 0.0      [id number] METHOD 09/11/2023 SSOP      B locus 09/11/2023 B27 Pos             Assessment:   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     Since his last visit, Ismael has had an MRI that demonstrates ongoing sacroiliitis. He also is HLA-B27 positive, this best places him in the ILAR criteria of enthesitis-related arthritis. We have started Humira therapy which is the appropriate next step in treatment. He has only received two doses so far, but already is having some anecdotal improvement.     We discussed the expected trajectory for Ismael's arthritis. Our first step is to get the  arthritis under control on medications so that the joint inflammation doesn't persist and damage Ismael's joints further. Once in remission on medications, we continue for two years on medication. We have data that suggests that having disease in remission for two years reduces the chance of flare when discontinuing medications. After two years in remission on medication, we will discuss weaning/cessation of medications.     We discussed transition to an adult provider. They will identify an adult rheumatologist in [place] and let me know who it is so that I can fax records and provide a warm handoff.       Health counseling reviewed: transition  Treat to Target:   [id number] score: 3.5  Treatment target set: Yes   Treatment target: inactive disease   Disease activity: not at target   Physical function: not at target   Use of algorithm:            Plan:   1. Labs: No labs today  2. Imaging: None  3. Medications: Continue Humira every other week  4. Eye exams: Yearly-due June 2024  5. Let us know who you decide to see in adult rheumatology. I will fax records.     If there are any new questions or concerns, I would be glad to help and can be reached through our main office at [phone] or our paging  at [phone].    Review of the result(s) of each unique test - as per HPI and assessment  Assessment requiring an independent historian(s) - family - dad  Ordering of each unique test  Prescription drug management  60 minutes spent by me on the date of the encounter doing chart review, history and exam, documentation and further activities per the note      Kaitlyn Sequeira MD MPH   of Pediatrics  Division of Rheumatology, Allergy, and Immunology       CC  Patient Care Team:  Daniella Chawla MD as PCP - General (Pediatrics)  Alysha Gonzalez MD as MD (Family Medicine - Sports Medicine)  Kaitlyn Sequeira MD as Fellow (Student in organized health care education/training  program)  Adalid Aponte MD as Referring Physician (Pediatric Gastroenterology)  Toñito Jeffers OD as Assigned Surgical Provider  Kaitlyn Reddy MD as Assigned Pediatric Specialist Provider  Kaitlyn Reddy MD as Fellow (Pediatric Rheumatology)  KAITLYN REDDY    Copy to patient  Linda Chapa [place]  [address] [facility] [address]

## 2023-10-30 NOTE — LETTER
10/30/2023      RE: Laurent Chapa  [id number] [facility] [address]     Dear Colleague,    Thank you for the opportunity to participate in the care of your patient, Laurent Chapa, at the [facility] at [facility]. Please see a copy of my visit note below.        Rheumatology History:   Date of symptom onset:    Date of first visit to center: 8/13/2020  Date of CESAR diagnosis: 8/13/2020  ILAR category: enthesitis-related  DANNIELLE Status: negative   RF Status: not done   CCP Status: not done   HLA-B27 Status: positive        Ophthalmology History:   Iritis/Uveitis Comorbidity: no   Date of last eye exam: 6/19/2023          Medications:   As of completion of this visit:  Current Outpatient Medications   Medication Sig Dispense Refill    adalimumab (HUMIRA *CF*) 40 MG/0.4ML pen kit Inject 0.4 mLs (40 mg) Subcutaneous every 14 days 2 each 11    EPINEPHrine (ANY BX GENERIC EQUIV) 0.3 MG/0.3ML injection 2-pack Inject 0.3 mg into the muscle as needed      Melatonin 2.5 MG CHEW 2.5 mg nightly or as needed.      meloxicam (MOBIC) 7.5 MG tablet Take 1 tablet (7.5 mg) by mouth daily 90 tablet 2    omeprazole (PRILOSEC) 20 MG DR capsule Take 1 capsule (20 mg) by mouth daily 30 capsule 3     Date of last TB Screen:  9/18/20         Allergies:     Allergies   Allergen Reactions    Peanuts [Nuts] Nausea and Vomiting            Subjective:   Laurent is a 18 year old male who was seen in Pediatric Rheumatology clinic today for follow up.  Laurent was last seen in our clinic on 9/11/2023 and returns today accompanied by his dad, Adama. The primary encounter diagnosis was Sacroiliitis (H24). Diagnoses of CESAR (juvenile idiopathic arthritis), enthesitis related arthritis (H), NSAID long-term use, Adalimumab (Humira) long-term use, and At risk for uveitis were also pertinent to this visit.      Ismael has been doing well since his last visit to  rheumatology.   Ismael got an MRI of his pelvis on 9/22/23:  MRI findings consistent with sacroiliitis, greater at the left sacroiliac joint, as above. This is located along the anterior inferior aspect of the left sacroiliac joint with bone marrow edema on both sides of the joint as well as enhancement. There is subtle edema and enhancement at the right anterior inferior sacroiliac joint,  however this has significantly decreased since the comparison MRI dated 8/4/2020.    After this MRI, we submitted a prior authorization for Humira. He has gotten two doses of the Humira and is doing well so far. He hasn't noticed a big difference. He had a little bit of buttock pain last night when he was getting ready for bed, but other than that he's been fine. He has stopped taking the meloxicam. Ismael has taken off from work to get ready for the move. He doesn't have a job lined up in [place] yet. He also hasn't been working out as much lately.     They are getting ready to move to [place]. Their house is still being built, but they'll stay in [place] until it's ready.    They have not picked out an adult provider yet. They are going to do it once they have moved. He will continue on his dad's  insurance.       Comprehensive Review of Systems is otherwise negative.    Information per our standardized questionnaire is as below:    Self Report  Patient Pain Status: 2 (This is measured 0 = no pain, 10 = very severe pain)  Patient Global Assessment of Disease Activity: 0.5 (This is measured 0 = very well, 10 = very poorly)  Patient Highest Level of Education: high school     Interim Arthritis History  Morning Stiffness in the past week: no stiffness  Recent Back Pain: No    Since your last visit has your arthritis stopped you from trying any athletic or rigorous activities or interfaced with your ability to do these activities? No  Have you been limited your ability to do normal daily activities in the past week?  "No  Did you need help from other people to do normal activities in the past week? No  Have you used any aids or devices to help you do normal daily activities in the past week? No    Important Medical Events  None         Examination:   Blood pressure 114/66, pulse 65, temperature 98.7  F (37.1  C), temperature source Oral, height 1.639 m (5' 4.53\"), weight 65.7 kg (144 lb 13.5 oz), SpO2 100%.  Facility age limit for growth %lauro is 20 years.  Growth %ile SmartLinks can only be used for patients less than 20 years old.  Body surface area is 1.73 meters squared.     Gen: Well appearing; cooperative. No acute distress.  Head: Normal head and hair.  Eyes: No scleral injection, pupils normal.  Nose: No deformity, no rhinorrhea or congestion. No sores.  Mouth: Normal teeth and gums. Moist mucus membranes.   Lymphatics: No cervical or supraclavicular lymphadenopathy.  Lungs: No increased work of breathing. Lungs clear to auscultation bilaterally.  Heart: Regular rate and rhythm. No murmurs. Normal S1/S2. Normal peripheral perfusion.  Abdomen: Soft, non-tender, non-distended. No hepatosplenomegaly.  Skin: No rashes or lesions.  Neuro: Alert, interactive. Answers questions appropriately. CN intact. Normal strength and tone.   MSK: Improved hip ROM. Still limited with internal rotation. No evidence of current synovitis/arthritis of the cervical spine, TMJ, sternoclavicular, acromioclavicular, glenohumeral, elbow, wrists, finger, hip, knee, ankle, or toe joints. No tendonitis or bursitis. No enthesitis.  No leg length discrepancy. Gait is normal with walking and running.     Positive MAITE test:  No    Total active joints:  2   Total limited joints:  2  Tender entheses count:  0  SI Tenderness: No         Last Lab Results:     No visits with results within 1 Day(s) from this visit.   Latest known visit with results is:   Office Visit on 09/11/2023   Component Date Value    Erythrocyte Sedimentatio* 09/11/2023 1     CRP " Inflammation 09/11/2023 <3.00     Creatinine 09/11/2023 1.01     GFR Estimate 09/11/2023 >90     Color Urine 09/11/2023 Light Yellow     Appearance Urine 09/11/2023 Clear     Glucose Urine 09/11/2023 Negative     Bilirubin Urine 09/11/2023 Negative     Ketones Urine 09/11/2023 Negative     Specific Gravity Urine 09/11/2023 1.025     Blood Urine 09/11/2023 Negative     pH Urine 09/11/2023 7.0     Protein Albumin Urine 09/11/2023 Negative     Urobilinogen Urine 09/11/2023 Normal     Nitrite Urine 09/11/2023 Negative     Leukocyte Esterase Urine 09/11/2023 Negative     Mucus Urine 09/11/2023 Present (A)     RBC Urine 09/11/2023 2     WBC Urine 09/11/2023 <1     Protein Total 09/11/2023 7.7     Albumin 09/11/2023 4.9     Bilirubin Total 09/11/2023 0.6     Alkaline Phosphatase 09/11/2023 80     AST 09/11/2023 28     ALT 09/11/2023 26     Bilirubin Direct 09/11/2023 <0.20     WBC Count 09/11/2023 7.9     RBC Count 09/11/2023 5.34     Hemoglobin 09/11/2023 15.3     Hematocrit 09/11/2023 45.8     MCV 09/11/2023 86     MCH 09/11/2023 28.7     MCHC 09/11/2023 33.4     RDW 09/11/2023 12.4     Platelet Count 09/11/2023 232     % Neutrophils 09/11/2023 59     % Lymphocytes 09/11/2023 28     % Monocytes 09/11/2023 10     % Eosinophils 09/11/2023 2     % Basophils 09/11/2023 0     % Immature Granulocytes 09/11/2023 1     NRBCs per 100 WBC 09/11/2023 0     Absolute Neutrophils 09/11/2023 4.7     Absolute Lymphocytes 09/11/2023 2.2     Absolute Monocytes 09/11/2023 0.8     Absolute Eosinophils 09/11/2023 0.1     Absolute Basophils 09/11/2023 0.0     Absolute Immature Granul* 09/11/2023 0.0     Absolute NRBCs 09/11/2023 0.0     [id number] METHOD 09/11/2023 SSOP     B locus 09/11/2023 B27 Pos             Assessment:   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     Since his last visit, Ismael has had an MRI that demonstrates ongoing sacroiliitis. He also is HLA-B27 positive, this best places him in the  ILAR criteria of enthesitis-related arthritis. We have started Humira therapy which is the appropriate next step in treatment. He has only received two doses so far, but already is having some anecdotal improvement.     We discussed the expected trajectory for Ismael's arthritis. Our first step is to get the arthritis under control on medications so that the joint inflammation doesn't persist and damage Ismael's joints further. Once in remission on medications, we continue for two years on medication. We have data that suggests that having disease in remission for two years reduces the chance of flare when discontinuing medications. After two years in remission on medication, we will discuss weaning/cessation of medications.     We discussed transition to an adult provider. They will identify an adult rheumatologist in [place] and let me know who it is so that I can fax records and provide a warm handoff.       Health counseling reviewed: transition  Treat to Target:   [id number] score: 3.5  Treatment target set: Yes   Treatment target: inactive disease   Disease activity: not at target   Physical function: not at target   Use of algorithm:            Plan:   Labs: No labs today  Imaging: None  Medications: Continue Humira every other week  Eye exams: Yearly-due June 2024  Let us know who you decide to see in adult rheumatology. I will fax records.     If there are any new questions or concerns, I would be glad to help and can be reached through our main office at [phone] or our paging  at [phone].    Review of the result(s) of each unique test - as per HPI and assessment  Assessment requiring an independent historian(s) - family - dad  Ordering of each unique test  Prescription drug management  60 minutes spent by me on the date of the encounter doing chart review, history and exam, documentation and further activities per the note      Kaitlyn Sequeira MD MPH   of  Pediatrics  Division of Rheumatology, Allergy, and Immunology       CC  Patient Care Team:  Daniella Chawla MD as PCP - General (Pediatrics)      Copy to patient  Linda Chapa [place]  [address] [facility] [address]

## 2023-10-30 NOTE — NURSING NOTE
"Chief Complaint   Patient presents with    Follow Up     Moving to [place] in two days, so will update pharmacy then.        Vitals:    10/30/23 1235   BP: 114/66   BP Location: Right arm   Patient Position: Sitting   Cuff Size: Adult Regular   Pulse: 65   Temp: 98.7  F (37.1  C)   TempSrc: Oral   SpO2: 100%   Weight: 144 lb 13.5 oz (65.7 kg)   Height: 5' 4.53\" (163.9 cm)       Patient MyChart Active? Yes  If no, would they like to sign up? No    Has patient signed a Consent to Communicate form to discuss health information with guardians if applicable? Yes    Does patient need PHQ-2 completed today? No    Depression Response    Patient completed the PHQ-9 assessment for depression and scored >9? Does not apply   Question 9 on the PHQ-9 was positive for suicidality? Does not apply   Does patient have current mental health provider? Does not apply     I personally notified the following: clinic nurse     Daniela Fairchild, EMT  October 30, 2023  "

## 2024-08-07 ENCOUNTER — TELEPHONE (OUTPATIENT)
Dept: RHEUMATOLOGY | Facility: CLINIC | Age: 19
End: 2024-08-07
Payer: OTHER GOVERNMENT

## 2024-08-07 NOTE — TELEPHONE ENCOUNTER
Left message for Laurent for an update on whether he has transferred care to adult. We receive a refill request for Humira and will hold until update provided. He was to move to [place].

## (undated) DEVICE — SUCTION MANIFOLD DORNOCH ULTRA CART UL-CL500

## (undated) DEVICE — SPECIMEN CONTAINER W/20ML 10% BUFF FORMALIN C4322-11

## (undated) DEVICE — SUCTION MANIFOLD NEPTUNE 2 SYS 4 PORT 0702-020-000

## (undated) DEVICE — ENDO FORCEP ENDOJAW BIOPSY 2.8MMX230CM FB-220U

## (undated) DEVICE — TUBING SUCTION MEDI-VAC 1/4"X20' N620A

## (undated) DEVICE — WIPE PREMOIST CLEANSING WASHCLOTHS 7988

## (undated) DEVICE — PAD CHUX UNDERPAD 30X36" P3036C

## (undated) DEVICE — TUBING ENDOGATOR HYBRID IRRIG 100610 EGP-100

## (undated) DEVICE — KIT ENDO TURNOVER/PROCEDURE CARRY-ON 101822

## (undated) DEVICE — SOL WATER IRRIG 1000ML BOTTLE 2F7114

## (undated) DEVICE — KIT CONNECTOR FOR OLYMPUS ENDOSCOPES DEFENDO 100310

## (undated) DEVICE — ENDO CAMERA PILLCAM CAPSULE 12HR SB3-EX FGS-0499

## (undated) DEVICE — ENDO BITE BLOCK PEDS BATRIK LATEX FREE B1

## (undated) RX ORDER — GLYCOPYRROLATE 0.2 MG/ML
INJECTION INTRAMUSCULAR; INTRAVENOUS
Status: DISPENSED
Start: 2020-09-18

## (undated) RX ORDER — PROPOFOL 10 MG/ML
INJECTION, EMULSION INTRAVENOUS
Status: DISPENSED
Start: 2020-09-18

## (undated) RX ORDER — ONDANSETRON 2 MG/ML
INJECTION INTRAMUSCULAR; INTRAVENOUS
Status: DISPENSED
Start: 2020-09-18

## (undated) RX ORDER — LIDOCAINE HYDROCHLORIDE 20 MG/ML
INJECTION, SOLUTION EPIDURAL; INFILTRATION; INTRACAUDAL; PERINEURAL
Status: DISPENSED
Start: 2020-09-18